# Patient Record
Sex: FEMALE | Race: WHITE | URBAN - METROPOLITAN AREA
[De-identification: names, ages, dates, MRNs, and addresses within clinical notes are randomized per-mention and may not be internally consistent; named-entity substitution may affect disease eponyms.]

---

## 2022-05-27 ENCOUNTER — OFFICE VISIT (OUTPATIENT)
Dept: URBAN - METROPOLITAN AREA CLINIC 44 | Facility: CLINIC | Age: 37
End: 2022-05-27
Payer: COMMERCIAL

## 2022-05-27 DIAGNOSIS — E11.9 TYPE 2 DIABETES MELLITUS W/O COMPLICATION: Primary | ICD-10-CM

## 2022-05-27 DIAGNOSIS — Z79.84 LONG TERM (CURRENT) USE OF ORAL HYPOGLYCEMIC DRUGS: ICD-10-CM

## 2022-05-27 PROCEDURE — 92004 COMPRE OPH EXAM NEW PT 1/>: CPT | Performed by: OPTOMETRIST

## 2022-05-27 ASSESSMENT — VISUAL ACUITY
OS: 20/20
OD: 20/20

## 2022-05-27 ASSESSMENT — INTRAOCULAR PRESSURE
OS: 18
OD: 15

## 2022-05-27 NOTE — IMPRESSION/PLAN
Impression: Type 2 diabetes mellitus w/o complication: K06.2. No vascular abnormalities noted per exam and OCT. Plan: PLAN: Discussed findings. Stressed importance of regular follow ups with PCP. Discussed with patient to maintain A1C at 7.0 or below will greatly decreased chances/progression of retinopathy. RTC 12 months for complete and OCT mac.  OPTOS (If DM for >10yrs)